# Patient Record
(demographics unavailable — no encounter records)

---

## 2025-01-09 NOTE — PHYSICAL EXAM
[Conjuctival Injection] : no conjunctival injection [Increased Tearing] : no increased tearing [Discharge] : no discharge [Eyelid Swelling] : eyelid swelling [Bilateral] : (bilateral) [Allergic Shiners] : no allergic shiners [NL] : moves all extremities x4, warm, well perfused x4 [de-identified] : derythematous dry scaling of skin above lip, erytheatous birthmark central and off to the right of the midline

## 2025-01-09 NOTE — HISTORY OF PRESENT ILLNESS
[de-identified] : Bilateral swollen itchy red eyes x2 months  [FreeTextEntry6] : dry itchy rash above lip for about 2 months, also around eyes, a bit swollen at times no new face products, no new skin care nothing that goes in that areas anyway does she dermatologist has a h/o of bad cradle cap when she was infant and h/o eczema as well

## 2025-01-09 NOTE — PLAN
[TextEntry] : wash eyes with baby shampoo as discussed,  use selsun blue dandruff shampoo to the scalp and on skin around mouth with taking care not to get it in eyes or mouth follow up by phone in 1 week for update symptoms  has derm apt scheduled mid feb

## 2025-01-16 NOTE — PLAN
[TextEntry] : stop erythromycin and shampoo trial 0.5% hydrocortisone cream to the perioral dermatitis follow up dermatology as discussed

## 2025-01-16 NOTE — HISTORY OF PRESENT ILLNESS
[de-identified] : Dry, itchy rash around eyes, medications not working.  [FreeTextEntry6] : rash on face not getting better antibiotic ointment not helping, skin more dry shampoo didnt help not using any other things  on face at this time has dermatologst apt pending

## 2025-01-16 NOTE — PHYSICAL EXAM
[NL] : nonerythematous oropharynx [FreeTextEntry5] : eyelids no longer swollen, but they are still red and dry appearing on the upper lids B/L [de-identified] : dry scaling around mouth without distinct erythema, no pustules/petechaie

## 2025-02-10 NOTE — PHYSICAL EXAM
[Alert] : alert [No Acute Distress] : no acute distress [Normocephalic] : normocephalic [EOMI Bilateral] : EOMI bilateral [Clear tympanic membranes with bony landmarks and light reflex present bilaterally] : clear tympanic membranes with bony landmarks and light reflex present bilaterally  [Pink Nasal Mucosa] : pink nasal mucosa [Nonerythematous Oropharynx] : nonerythematous oropharynx [Supple, full passive range of motion] : supple, full passive range of motion [No Palpable Masses] : no palpable masses [Clear to Auscultation Bilaterally] : clear to auscultation bilaterally [Regular Rate and Rhythm] : regular rate and rhythm [Normal S1, S2 audible] : normal S1, S2 audible [No Murmurs] : no murmurs [+2 Femoral Pulses] : +2 femoral pulses [Soft] : soft [NonTender] : non tender [Non Distended] : non distended [Normoactive Bowel Sounds] : normoactive bowel sounds [No Hepatomegaly] : no hepatomegaly [No Splenomegaly] : no splenomegaly [Jesus: ____] : Jesus [unfilled] [No Masses] : no masses [Jesus: _____] : Jesus [unfilled] [Normal External Genitalia] : normal external genitalia [No Abnormal Lymph Nodes Palpated] : no abnormal lymph nodes palpated [No pain or deformities with palpation of bone, muscles, joints] : no pain or deformities with palpation of bone, muscles, joints [Straight] : straight [de-identified] : dry patches around mouth

## 2025-02-10 NOTE — HISTORY OF PRESENT ILLNESS
[Mother] : mother [Yes] : Patient goes to dentist yearly [Vitamin] : Primary Fluoride Source: Vitamin [Up to date] : Up to date [Normal] : normal [Eats meals with family] : eats meals with family [Has family members/adults to turn to for help] : has family members/adults to turn to for help [Is permitted and is able to make independent decisions] : Is permitted and is able to make independent decisions [Grade: ____] : Grade: [unfilled] [Normal Performance] : normal performance [Normal Behavior/Attention] : normal behavior/attention [Normal Homework] : normal homework [Eats regular meals including adequate fruits and vegetables] : eats regular meals including adequate fruits and vegetables [Drinks non-sweetened liquids] : drinks non-sweetened liquids  [Calcium source] : calcium source [Has friends] : has friends [At least 1 hour of physical activity a day] : at least 1 hour of physical activity a day [Screen time (except homework) less than 2 hours a day] : screen time (except homework) less than 2 hours a day [Has interests/participates in community activities/volunteers] : has interests/participates in community activities/volunteers. [Uses safety belts/safety equipment] : uses safety belts/safety equipment  [Has peer relationships free of violence] : has peer relationships free of violence [No] : Patient has not had sexual intercourse. [HIV Screening Declined] : HIV Screening Declined [Has ways to cope with stress] : has ways to cope with stress [Displays self-confidence] : displays self-confidence [With Teen] : teen [LMP: _____] : LMP: [unfilled] [Irregular menses] : no irregular menses [Sleep Concerns] : no sleep concerns [Has concerns about body or appearance] : does not have concerns about body or appearance [Uses electronic nicotine delivery system] : does not use electronic nicotine delivery system [Exposure to electronic nicotine delivery system] : no exposure to electronic nicotine delivery system [Uses tobacco] : does not use tobacco [Exposure to tobacco] : no exposure to tobacco [Uses drugs] : does not use drugs  [Exposure to drugs] : no exposure to drugs [Drinks alcohol] : does not drink alcohol [Exposure to alcohol] : no exposure to alcohol [Impaired/distracted driving] : no impaired/distracted driving [Has problems with sleep] : does not have problems with sleep [Gets depressed, anxious, or irritable/has mood swings] : does not get depressed, anxious, or irritable/has mood swings [Has thought about hurting self or considered suicide] : has not thought about hurting self or considered suicide [FreeTextEntry7] : 16yr well visit  [de-identified] : track [FreeTextEntry1] : has eczema - sees dermatology - has appt next week.  moistruzing frequently.    lipids were borderline - total cholesterol 180, LDL normal

## 2025-02-10 NOTE — DISCUSSION/SUMMARY
[Normal Growth] : growth [Normal Development] : development  [No Elimination Concerns] : elimination [Continue Regimen] : feeding [No Skin Concerns] : skin [Normal Sleep Pattern] : sleep [None] : no medical problems [Anticipatory Guidance Given] : Anticipatory guidance addressed as per the history of present illness section [Physical Growth and Development] : physical growth and development [Social and Academic Competence] : social and academic competence [Emotional Well-Being] : emotional well-being [Risk Reduction] : risk reduction [Violence and Injury Prevention] : violence and injury prevention [No Vaccines] : no vaccines needed [No Medications] : ~He/She~ is not on any medications [Patient] : patient [Parent/Guardian] : Parent/Guardian [Full Activity without restrictions including Physical Education & Athletics] : Full Activity without restrictions including Physical Education & Athletics [] : The components of the vaccine(s) to be administered today are listed in the plan of care. The disease(s) for which the vaccine(s) are intended to prevent and the risks have been discussed with the caretaker.  The risks are also included in the appropriate vaccination information statements which have been provided to the patient's caregiver.  The caregiver has given consent to vaccinate. [FreeTextEntry1] : Continue balanced diet with all food groups. Brush teeth twice a day with toothbrush. Recommend visit to dentist. Maintain consistent daily routines and sleep schedule. Personal hygiene, puberty, and sexual health reviewed. Risky behaviors assessed. School discussed. Limit screen time to no more than 2 hours per day. Encourage physical activity. Return 2 months for gardasil. #2 Cardiac questionnaire reviewed, NO issues. 5-2-1-0 questionnaire reviewed, parent(s) have no issues or concerns. Discussed in the preferred language of English f/u with dermatology lipids were borderline = can repeat in 2-3 years, no FH, healthy diet

## 2025-05-22 NOTE — IMAGING
[de-identified] : The patient is a well appearing 16 year old female of their stated age. Neck is supple & nontender to palpation. Negative Spurling's test.   Effected Hand/Wrist: LEFT   ROM: Wrist Flexion: 0-80 degrees Wrist Extension: 0-30 degrees Finger Flexion/Extension:  Full without deformity   Inspection: Erythema: None Ecchymosis: None Abrasions: None Effusion: MILD Deformity: None   Palpation: Crepitus: None Radial Head: Nontender Radial Shaft: Nontender Distal Radius: Nontender Olecranon: Nontender Ulnar Shaft: Nontender Distal Ulna:  Nontender Interosseous Ligament: Nontender Proximal Carpal Row: Nontender Distal Carpal Row: Nontender Anatomic Snuff Box: TENDER  TFCC: Nontender Thumb UCL:  Nontender Metacarpals: Nontender Proximal/Middle/Distal Phalanx 1-5: Nontender Stress Testing: Thumb UCL 0: Stable Thumb UCL 30: Stable   Motor:  Wrist Flexion: 5 out of 5 Wrist Extension: 5 out of 5 WITH PAIN  Interossei: 5 out of 5 : 5 out of 5 Finger Flexion: 5 out of 5 Finger Extension: 5 out of 5  Neurologic Exam: Axillary Nerve:  SLT Radial Nerve: SLT Median Nerve: SLT Ulnar Nerve:  SLT Other:  N/A Vascular Exam: Radial Pulse: 2+ Ulnar Pulse: 2+ Capillary Refill: <2 Seconds Nerve Compression Tests: Carpal Tunnel Compression Test: Negative Elbow Ulnar Nerve Tinels Test: Negative  Other Exams: None   Pertinent Contralateral Hand/Wrist Findings: None     Assessment: The patient is a 16-year-old female with Left wrist pain and radiographic and physical exam findings consistent with Possible scaphoid fracture  The patient's condition is acute.  Documents/Results Reviewed Today: X-Ray Left wrist Tests/Studies Independently Interpreted Today: X-Ray Left wrist reveals evidence of skeletally immature individual  Pertinent findings include:  Tender anatomic snuff box, pain with wrist extension, radial deviation, mild effusion  Confounding medical conditions/concerns: None   Plan: Due to worsening pain and instability with mechanical symptoms, patient will obtain STAT MRI Left wrist to evaluate for possible scaphoid fracture. In the interim, we reviewed appropriate use of OTC anti-inflammatories as needed for pain, inflammation, and discomfort. Modify activity as discussed. Tests Ordered: STAT MRI Right wrist  Prescription Medications Ordered: None Braces/DME Ordered:  None Activity/Work/Sports Status: Shut down from gym/sports- can ride stationary bike  Additional Instructions: None Follow-Up: s/p STAT MRI  The patient's current medication management of their orthopedic diagnosis was reviewed today: The patient declined and/or was contraindicated for the recommended prescription medication Naprosyn and will use over the counter Advil, Alleve, Voltaren Gel or Tylenol as directed.  (1) We discussed a comprehensive treatment plan that included possible pharmaceutical management involving the use of prescription strength medications versus over the counter oral medications and topical prescription vs over the counter medications.  Based on our extensive discussion, the patient declined prescription medication and will use over the counter Advil, Aleve, Voltaren Gel or Tylenol as directed. (2) There is a moderate risk of morbidity with further treatment, especially from use of prescription strength medications and possible side effects of these medications which include upset stomach with oral medications, skin reactions to topical medications and cardiac/renal issues with long term use. (3) I recommended that the patient follow-up with their medical physician to discuss any significant specific potential issues with long term medication use such as interactions with current medications or with exacerbation of underlying medical comorbidities. (4) The benefits and risks associated with use of injectable, oral or topical, prescription and over the counter anti-inflammatory medications were discussed with the patient. The patient voiced understanding of the risks including but not limited to bleeding, stroke, kidney dysfunction, heart disease, and were referred to the black box warning label for further information.  Elena COOK attest that this documentation has been prepared under the direction and in the presence of Provider Dr. Vincent Israel.  The documentation recorded by the scribe accurately reflects the services Dr. Vincent COOK, personally performed and the decisions made by me.

## 2025-05-22 NOTE — HISTORY OF PRESENT ILLNESS
[de-identified] : The patient is a 16 year  old right hand dominant female who presents today complaining of left wrist pain  Date of Injury/Onset: 5/19/25 Pain:    At Rest: 3/10  With Activity:  6-7/10  Mechanism of injury: While pole vaulting she got stuck, fell, and landed on outstretched hand  This is NOT a Work Related Injury being treated under Worker's Compensation. This IS an athletic injury occurring associated with an interscholastic or organized sports team. 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

## 2025-06-06 NOTE — DATA REVIEWED
[FreeTextEntry1] : STAT MRI Left wrist reveals evidence of distal pull scaphoid fracture non displaced

## 2025-06-06 NOTE — IMAGING
[de-identified] : The patient is a well appearing 16 year old female of their stated age. Neck is supple & nontender to palpation. Negative Spurling's test.   Effected Hand/Wrist: LEFT   ROM: Wrist Flexion: 0-80 degrees Wrist Extension: 0-30 degrees Finger Flexion/Extension:  Full without deformity   Inspection: Erythema: None Ecchymosis: None Abrasions: None Effusion: MILD Deformity: None   Palpation: Crepitus: None Radial Head: Nontender Radial Shaft: Nontender Distal Radius: Nontender Olecranon: Nontender Ulnar Shaft: Nontender Distal Ulna:  Nontender Interosseous Ligament: Nontender Proximal Carpal Row: Nontender Distal Carpal Row: Nontender Anatomic Snuff Box: TENDER  TFCC: Nontender Thumb UCL:  Nontender Metacarpals: Nontender Proximal/Middle/Distal Phalanx 1-5: Nontender Stress Testing: Thumb UCL 0: Stable Thumb UCL 30: Stable   Motor:  Wrist Flexion: 5 out of 5 Wrist Extension: 5 out of 5 WITH PAIN  Interossei: 5 out of 5 : 5 out of 5 Finger Flexion: 5 out of 5 Finger Extension: 5 out of 5  Neurologic Exam: Axillary Nerve:  SLT Radial Nerve: SLT Median Nerve: SLT Ulnar Nerve:  SLT Other:  N/A Vascular Exam: Radial Pulse: 2+ Ulnar Pulse: 2+ Capillary Refill: <2 Seconds Nerve Compression Tests: Carpal Tunnel Compression Test: Negative Elbow Ulnar Nerve Tinels Test: Negative  Other Exams: None   Pertinent Contralateral Hand/Wrist Findings: None     Assessment: The patient is a 16-year-old female with Left wrist pain and radiographic and physical exam findings consistent with  scaphoid fracture  The patient's condition is acute.  Documents/Results Reviewed Today: STAT MRI Left wrist Tests/Studies Independently Interpreted Today: STAT MRI Left wrist reveals evidence of distal pull scaphoid fracture non displaced  Pertinent findings include:  Tender anatomic snuff box, pain with wrist extension, radial deviation, mild effusion  Confounding medical conditions/concerns: None   Plan: Upon review of radiographic imaging, discussed treatment options for the patient's scaphoid fracture. The patient will be put in a exos short arm thumb spica to ensure proper healing. The patient is shut down from gym/sports until further notice. Discussed appropriate use of OTC anti-inflammatories as needed for pain, inflammation, and discomfort - use as directed and take with food. The patient will follow up with Dr. Angeles for further evaluation and indefinite treatment.  Tests Ordered: None  Prescription Medications Ordered: None Braces/DME Ordered:  Exos short arm thumb spics Activity/Work/Sports Status: Shut down from gym/sports- can ride stationary bike  Additional Instructions: None Follow-Up: with Dr. Angeles  The patient's current medication management of their orthopedic diagnosis was reviewed today: The patient declined and/or was contraindicated for the recommended prescription medication Naprosyn and will use over the counter Advil, Alleve, Voltaren Gel or Tylenol as directed.  (1) We discussed a comprehensive treatment plan that included possible pharmaceutical management involving the use of prescription strength medications versus over the counter oral medications and topical prescription vs over the counter medications.  Based on our extensive discussion, the patient declined prescription medication and will use over the counter Advil, Aleve, Voltaren Gel or Tylenol as directed. (2) There is a moderate risk of morbidity with further treatment, especially from use of prescription strength medications and possible side effects of these medications which include upset stomach with oral medications, skin reactions to topical medications and cardiac/renal issues with long term use. (3) I recommended that the patient follow-up with their medical physician to discuss any significant specific potential issues with long term medication use such as interactions with current medications or with exacerbation of underlying medical comorbidities. (4) The benefits and risks associated with use of injectable, oral or topical, prescription and over the counter anti-inflammatory medications were discussed with the patient. The patient voiced understanding of the risks including but not limited to bleeding, stroke, kidney dysfunction, heart disease, and were referred to the black box warning label for further information.  Elena COOK attest that this documentation has been prepared under the direction and in the presence of Provider Dr. Vincent Israel.  The documentation recorded by the scribe accurately reflects the services IDr. Vincent, personally performed and the decisions made by me.

## 2025-06-06 NOTE — HISTORY OF PRESENT ILLNESS
[de-identified] : The patient is a 16 year  old right hand dominant female who presents today complaining of left wrist pain  Date of Injury/Onset: 5/19/25 Pain:    At Rest: 3/10  With Activity:  6-7/10  Mechanism of injury: While pole vaulting she got stuck, fell, and landed on outstretched hand  This is NOT a Work Related Injury being treated under Worker's Compensation. This IS an athletic injury occurring associated with an interscholastic or organized sports team. 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  Additional Information: None

## 2025-06-06 NOTE — IMAGING
[de-identified] : The patient is a well appearing 16 year old female of their stated age. Neck is supple & nontender to palpation. Negative Spurling's test.   Effected Hand/Wrist: LEFT   ROM: Wrist Flexion: 0-80 degrees Wrist Extension: 0-30 degrees Finger Flexion/Extension:  Full without deformity   Inspection: Erythema: None Ecchymosis: None Abrasions: None Effusion: MILD Deformity: None   Palpation: Crepitus: None Radial Head: Nontender Radial Shaft: Nontender Distal Radius: Nontender Olecranon: Nontender Ulnar Shaft: Nontender Distal Ulna:  Nontender Interosseous Ligament: Nontender Proximal Carpal Row: Nontender Distal Carpal Row: Nontender Anatomic Snuff Box: TENDER  TFCC: Nontender Thumb UCL:  Nontender Metacarpals: Nontender Proximal/Middle/Distal Phalanx 1-5: Nontender Stress Testing: Thumb UCL 0: Stable Thumb UCL 30: Stable   Motor:  Wrist Flexion: 5 out of 5 Wrist Extension: 5 out of 5 WITH PAIN  Interossei: 5 out of 5 : 5 out of 5 Finger Flexion: 5 out of 5 Finger Extension: 5 out of 5  Neurologic Exam: Axillary Nerve:  SLT Radial Nerve: SLT Median Nerve: SLT Ulnar Nerve:  SLT Other:  N/A Vascular Exam: Radial Pulse: 2+ Ulnar Pulse: 2+ Capillary Refill: <2 Seconds Nerve Compression Tests: Carpal Tunnel Compression Test: Negative Elbow Ulnar Nerve Tinels Test: Negative  Other Exams: None   Pertinent Contralateral Hand/Wrist Findings: None     Assessment: The patient is a 16-year-old female with Left wrist pain and radiographic and physical exam findings consistent with  scaphoid fracture  The patient's condition is acute.  Documents/Results Reviewed Today: STAT MRI Left wrist Tests/Studies Independently Interpreted Today: STAT MRI Left wrist reveals evidence of distal pull scaphoid fracture non displaced  Pertinent findings include:  Tender anatomic snuff box, pain with wrist extension, radial deviation, mild effusion  Confounding medical conditions/concerns: None   Plan: Upon review of radiographic imaging, discussed treatment options for the patient's scaphoid fracture. The patient will be put in a exos short arm thumb spica to ensure proper healing. The patient is shut down from gym/sports until further notice. Discussed appropriate use of OTC anti-inflammatories as needed for pain, inflammation, and discomfort - use as directed and take with food. The patient will follow up with Dr. Angeles for further evaluation and indefinite treatment.  Tests Ordered: None  Prescription Medications Ordered: None Braces/DME Ordered:  Exos short arm thumb spics Activity/Work/Sports Status: Shut down from gym/sports- can ride stationary bike  Additional Instructions: None Follow-Up: with Dr. Angeles  The patient's current medication management of their orthopedic diagnosis was reviewed today: The patient declined and/or was contraindicated for the recommended prescription medication Naprosyn and will use over the counter Advil, Alleve, Voltaren Gel or Tylenol as directed.  (1) We discussed a comprehensive treatment plan that included possible pharmaceutical management involving the use of prescription strength medications versus over the counter oral medications and topical prescription vs over the counter medications.  Based on our extensive discussion, the patient declined prescription medication and will use over the counter Advil, Aleve, Voltaren Gel or Tylenol as directed. (2) There is a moderate risk of morbidity with further treatment, especially from use of prescription strength medications and possible side effects of these medications which include upset stomach with oral medications, skin reactions to topical medications and cardiac/renal issues with long term use. (3) I recommended that the patient follow-up with their medical physician to discuss any significant specific potential issues with long term medication use such as interactions with current medications or with exacerbation of underlying medical comorbidities. (4) The benefits and risks associated with use of injectable, oral or topical, prescription and over the counter anti-inflammatory medications were discussed with the patient. The patient voiced understanding of the risks including but not limited to bleeding, stroke, kidney dysfunction, heart disease, and were referred to the black box warning label for further information.  Elena COOK attest that this documentation has been prepared under the direction and in the presence of Provider Dr. Vincent Israel.  The documentation recorded by the scribe accurately reflects the services IDr. Vincent, personally performed and the decisions made by me.

## 2025-06-25 NOTE — HISTORY OF PRESENT ILLNESS
[de-identified] : Age: 16 year F PMHx: Hand Dominance: RHD Chief Complaint: Left Wrist pain ongoing since 5/19/25.  Trauma: Patient reports that she was pole vaulting, and got stuck while in motion, causing her to fall and landed on outstretched left hand. Patient reports following injury she was seen by her school ATC, iced, and was wrapped. Returned to  following day and was advised to consult Ortho. Patient then saw Dr. Israel who completed xrays and placed patient in brace and sent patient for MRI, upon follow up was ref. to Dr. Angeles for further evaluation and treatment.  Outside Imaging/Treatment: Xray completed at [facility] with [facility]. Israel, MRI completed at  [facility] Medications: None OT/PT: None Bracing: Thumb Spica Left Pain worse with: Use of the left hand, activity Pain better with: Bracing, NSAIDs

## 2025-06-25 NOTE — ASSESSMENT
[FreeTextEntry1] : EXAM Left wrist with mild swelling, +++ttp at radial styloid and at scaphoid waist. Able to make fist, oppose thumb to small finger and abduct fingers. Sensation intact throughout. <2sec cap refill.  Left wrist radiographs with no overt fracture nor dislocation. Carpus aligned. (3-view) Left wrist MRI with scaphoid waist fracture, nondisplaced  ASSESSMENT/PLAN Left scaphoid fracture, waist - will manage with closed management [CPT 70890] Reviewed MRI and pathoanatomy with patient/parent and discussed management ladder to include NSAIDs prn, activity modification, OT, thumb spica. Risk of nonunion, malunion, arthrosis, pain stiffness. Will manage with nonoperative management in cast.  Acute complicated injury - suspicion for fracture of scaphoid with potential for operative intervention  F/u 4weeks; repeat SCAPHOID FILMS, 5 view.

## 2025-06-25 NOTE — ASSESSMENT
[FreeTextEntry1] : EXAM Left wrist with mild swelling, +++ttp at radial styloid and at scaphoid waist. Able to make fist, oppose thumb to small finger and abduct fingers. Sensation intact throughout. <2sec cap refill.  Left wrist radiographs with no overt fracture nor dislocation. Carpus aligned. (3-view) Left wrist MRI with scaphoid waist fracture, nondisplaced  ASSESSMENT/PLAN Left scaphoid fracture, waist - will manage with closed management [CPT 26727] Reviewed MRI and pathoanatomy with patient/parent and discussed management ladder to include NSAIDs prn, activity modification, OT, thumb spica. Risk of nonunion, malunion, arthrosis, pain stiffness. Will manage with nonoperative management in cast.  Acute complicated injury - suspicion for fracture of scaphoid with potential for operative intervention  F/u 4weeks; repeat SCAPHOID FILMS, 5 view.

## 2025-06-25 NOTE — HISTORY OF PRESENT ILLNESS
[de-identified] : Age: 16 year F PMHx: Hand Dominance: RHD Chief Complaint: Left Wrist pain ongoing since 5/19/25.  Trauma: Patient reports that she was pole vaulting, and got stuck while in motion, causing her to fall and landed on outstretched left hand. Patient reports following injury she was seen by her school ATC, iced, and was wrapped. Returned to  following day and was advised to consult Ortho. Patient then saw Dr. Israel who completed xrays and placed patient in brace and sent patient for MRI, upon follow up was ref. to Dr. Angeles for further evaluation and treatment.  Outside Imaging/Treatment: Xray completed at [facility] with [facility]. Israel, MRI completed at  [facility] Medications: None OT/PT: None Bracing: Thumb Spica Left Pain worse with: Use of the left hand, activity Pain better with: Bracing, NSAIDs

## 2025-07-07 NOTE — HISTORY OF PRESENT ILLNESS
[de-identified] : Age: 16 year F PMHx: none Hand Dominance: RHD Chief Complaint: Left Wrist pain ongoing since 5/19/25.  Trauma: Patient reports that she was pole vaulting, and got stuck while in motion, causing her to fall and landed on outstretched left hand. Patient reports following injury she was seen by her school ATC, iced, and was wrapped. Returned to  following day and was advised to consult Ortho. Patient then saw Dr. Israel who completed xrays and placed patient in brace and sent patient for MRI, upon follow up was ref. to Dr. Angeles for further evaluation and treatment.  Outside Imaging/Treatment: Xray completed at [facility] with Dr. Israel, MRI completed at  [facility] Medications: None OT/PT: None Bracing: Thumb Spica Left Pain worse with: Use of the left hand, activity Pain better with: Bracing, NSAIDs  07/07/25: f/u left wrist. Patient reports that she is doing well, reporting no pain or discomfort at this time. Patient presents in a wrist brace and reports being compliant with it. Denies numbness/tingling.  ***Accompanied by mother***

## 2025-07-07 NOTE — ASSESSMENT
[FreeTextEntry1] : EXAM Left wrist with mild swelling, -ttp at radial styloid and at scaphoid waist. Able to make fist, oppose thumb to small finger and abduct fingers. Sensation intact throughout. <2sec cap refill.  Left wrist radiographs with carpus aligned.  +callus at scaphoid waist, alignment maintained. (3-view) Left wrist MRI with scaphoid waist fracture, nondisplaced  ASSESSMENT/PLAN Left scaphoid fracture, waist - will continue to manage with closed management [CPT 52295] Reviewed MRI and pathoanatomy with patient/parent and discussed management ladder to include NSAIDs prn, activity modification, OT, thumb spica. Risk of nonunion, malunion, arthrosis, pain stiffness. Will manage with nonoperative management in cast. Ease out of brace and into use. OT for ROM  Acute complicated injury - suspicion for fracture of scaphoid with potential for operative intervention  F/u 6weeks; repeat SCAPHOID FILMS, 5 view.